# Patient Record
Sex: MALE | URBAN - METROPOLITAN AREA
[De-identification: names, ages, dates, MRNs, and addresses within clinical notes are randomized per-mention and may not be internally consistent; named-entity substitution may affect disease eponyms.]

---

## 2024-11-27 ENCOUNTER — OFFICE VISIT (OUTPATIENT)
Dept: URGENT CARE | Age: 34
End: 2024-11-27
Payer: COMMERCIAL

## 2024-11-27 ENCOUNTER — APPOINTMENT (OUTPATIENT)
Dept: URGENT CARE | Age: 34
End: 2024-11-27
Payer: COMMERCIAL

## 2024-11-27 VITALS
DIASTOLIC BLOOD PRESSURE: 80 MMHG | TEMPERATURE: 97.7 F | OXYGEN SATURATION: 98 % | SYSTOLIC BLOOD PRESSURE: 112 MMHG | RESPIRATION RATE: 18 BRPM | HEART RATE: 95 BPM

## 2024-11-27 DIAGNOSIS — L08.9 SKIN INFECTION: Primary | ICD-10-CM

## 2024-11-27 PROCEDURE — 87491 CHLMYD TRACH DNA AMP PROBE: CPT

## 2024-11-27 PROCEDURE — 87205 SMEAR GRAM STAIN: CPT

## 2024-11-27 PROCEDURE — 87075 CULTR BACTERIA EXCEPT BLOOD: CPT

## 2024-11-27 PROCEDURE — 87070 CULTURE OTHR SPECIMN AEROBIC: CPT

## 2024-11-27 PROCEDURE — 87186 SC STD MICRODIL/AGAR DIL: CPT

## 2024-11-27 PROCEDURE — 87077 CULTURE AEROBIC IDENTIFY: CPT

## 2024-11-27 PROCEDURE — 87185 SC STD ENZYME DETCJ PER NZM: CPT

## 2024-11-27 PROCEDURE — 87529 HSV DNA AMP PROBE: CPT

## 2024-11-27 PROCEDURE — 87591 N.GONORRHOEAE DNA AMP PROB: CPT

## 2024-11-27 PROCEDURE — 87661 TRICHOMONAS VAGINALIS AMPLIF: CPT

## 2024-11-27 RX ORDER — DOXYCYCLINE 100 MG/1
100 CAPSULE ORAL 2 TIMES DAILY
Qty: 14 CAPSULE | Refills: 0 | Status: SHIPPED | OUTPATIENT
Start: 2024-11-27 | End: 2024-12-04

## 2024-11-27 NOTE — PROGRESS NOTES
Subjective   Patient ID: Jordan Villareal is a 34 y.o. male. They present today with a chief complaint of std check (Patient started noticing acne lesions on inner buttocks, close to and around anus. Onset 3 days ago. Patient states he has one partner. ).    History of Present Illness  HPI  Patient presents to the urgent care as a 34-year-old male with a 3-day history of skin infection on buttocks.  Patient denies any pain, itching, or discharge.  Patient states he has been with 1 partner, but is concerned for genital wart.  Patient states he did get new workout shorts to have a inside compression, patient works out regularly, sometimes for a long time, but does report showering immediately after finishing his workouts.    Past Medical History  Allergies as of 11/27/2024    (No Known Allergies)       (Not in a hospital admission)       No past medical history on file.    No past surgical history on file.     reports that he has never smoked. He has never used smokeless tobacco. He reports current alcohol use.    Review of Systems  Review of Systems     Patient denies scrotal edema, penile discharge, testicular pain, changes in medication, foods, laundry detergents, lotions, soap, new foods, numbness, tingling, discharge, sore throat, fever, dizziness, shortness of breath, increased urinary frequency, chills, peripheral edema, abdominal pain, chest pain and myalgias.                          Objective    Vitals:    11/27/24 1706   BP: 112/80   Pulse: 95   Resp: 18   Temp: 36.5 °C (97.7 °F)   SpO2: 98%     No LMP for male patient.    Physical Exam  Appearance: Alert, oriented, cooperative, in no acute distress. Well nourished & well hydrated.    Skin: no petechiae or purpura. Good skin turgor. Xray tech Amira present for exam, 4-5 individual pustules w mild surrounding erythema on bilateral buttocks, small smooth flesh colored skin tag approx 0.1 cm at 7 o'clock position to anus approximately 1 inch from anus,     Eyes:  Conjunctiva pink with no redness or exudates. Eyelids without lesions. No scleral icterus.     ENT: Hearing grossly intact. External inspection of ears without lesions or erythema. no nasal flaring. moist oral mucosa, no tripoding, no drooling, no difficulty swallowing oral secretions.    Pulmonary:  Good chest wall excursion. No accessory muscle use or stridor. no difficulty completing full sentences without taking a breath    Cardiac:  No JVD.     Musculoskeletal: no deformity. No cyanosis, clubbing.     Neurological: no focal findings identified.    Psychiatric: Appropriate mood and affect.    Procedures    Point of Care Test & Imaging Results from this visit  No results found for this visit on 11/27/24.   No results found.    Diagnostic study results (if any) were reviewed by Natalia Morales PA-C.    Assessment/Plan   Allergies, medications, history, and pertinent labs/EKGs/Imaging reviewed by Natalia Morales PA-C.     Medical Decision Making  Considerations for patient's symptoms include viral/bacterial infection, ingrown hair.  Patient expressed concerns for genital warts, STI.  Patient has been with 1 partner and had no known exposure.  Area of concern on patient's for wart on exam-smooth mildly raised faint skin tag approx 0.1 cm, patient also had 4-5 individual pustules, appears as acne.  Cultures will be sent out for HSV, bacteria, gonorrhea, chlamydia, trichomonas.  Discussed with patient at this time folliculitis considered the most likely diagnosis.  Recommend using exfoliation after exercising, and wearing shorts that are easily breathable.  Patient will be given trial of doxycycline while cultures are pending.  Recommended follow-up with PCP if symptoms are not improving with treatment.  Patient verbalizes understanding and agrees with plan of care.  All questions were answered.     Dictation software was used in the creation of this note which does not evaluate or correct for typographical,  spelling, syntax or grammatical errors.    Orders and Diagnoses  There are no diagnoses linked to this encounter.    Medical Admin Record      Patient disposition: Home    Electronically signed by Natalia Morales PA-C  5:13 PM

## 2024-11-27 NOTE — PATIENT INSTRUCTIONS
Begin taking doxycycline as instructed  Cultures were sent out for HSV, general bacteria, gonorrhea, chlamydia, trichomonas.  You will be notified if positive.  Follow-up with your primary care if symptoms are not improving, if you have any worsening symptoms such as pain, fever, spread of the rash please go to the ER for further evaluation and treatment.

## 2024-11-28 LAB
C TRACH RRNA SPEC QL NAA+PROBE: NEGATIVE
HSV1 DNA SKIN QL NAA+PROBE: NOT DETECTED
HSV2 DNA SKIN QL NAA+PROBE: NOT DETECTED
N GONORRHOEA DNA SPEC QL PROBE+SIG AMP: NEGATIVE
T VAGINALIS RRNA SPEC QL NAA+PROBE: NEGATIVE

## 2024-12-01 LAB
B-LACTAMASE ORGANISM ISLT: NEGATIVE
BACTERIA SPEC CULT: ABNORMAL
BACTERIA SPEC CULT: ABNORMAL
GRAM STN SPEC: ABNORMAL
GRAM STN SPEC: ABNORMAL

## 2025-02-13 ENCOUNTER — OFFICE VISIT (OUTPATIENT)
Dept: URGENT CARE | Age: 35
End: 2025-02-13
Payer: COMMERCIAL

## 2025-02-13 DIAGNOSIS — R09.89 CHEST CONGESTION: Primary | ICD-10-CM

## 2025-02-13 RX ORDER — AMOXICILLIN AND CLAVULANATE POTASSIUM 875; 125 MG/1; MG/1
TABLET, FILM COATED ORAL
COMMUNITY
Start: 2025-02-13

## 2025-02-13 ASSESSMENT — ENCOUNTER SYMPTOMS
COUGH: 1
FATIGUE: 1

## 2025-02-14 NOTE — PROGRESS NOTES
Subjective   Patient ID: Jordan Villareal is a 35 y.o. male. They present today with a chief complaint of Illness (Day 4 - Cough, Nasal congestion, ), Fatigue, and Chills.    History of Present Illness    Illness  Associated symptoms: congestion, cough and fatigue    Fatigue  Associated symptoms: congestion, cough and fatigue      This is a 35-year-old male who presents this evening requesting a chest x-ray.  Patient apparently was seen at another facility shortly prior to his arrival where he was prescribed Augmentin, Bromfed and a Medrol Dosepak.  Patient states she was tested negative for COVID ,flu and strep at the other facility.  Patient was requesting a chest x-ray in order to rule out pneumonia.  The patient was informed that no x-rays in our facility are available this evening.  He was advised to return tomorrow but instructed to start his prescribed medication.  He denies any chest pain or shortness of breath.  Past Medical History  Allergies as of 02/13/2025    (No Known Allergies)       (Not in a hospital admission)       History reviewed. No pertinent past medical history.    History reviewed. No pertinent surgical history.     reports that he has never smoked. He has never used smokeless tobacco. He reports current alcohol use.    Review of Systems  Review of Systems   Constitutional:  Positive for fatigue.   HENT:  Positive for congestion.    Respiratory:  Positive for cough.    All other systems reviewed and are negative.                                 Objective    There were no vitals filed for this visit.  No LMP for male patient.    Physical Exam  No physical examination was performed on the patient.  Procedures    Point of Care Test & Imaging Results from this visit  No results found for this visit on 02/13/25.   No results found.    Diagnostic study results (if any) were reviewed by Luis Guevara DO.    Assessment/Plan   Allergies, medications, history, and pertinent labs/EKGs/Imaging  reviewed by Luis Guevara DO.     Medical Decision Making      Orders and Diagnoses  Diagnoses and all orders for this visit:  Chest congestion      Medical Admin Record      Patient disposition: Home    Electronically signed by Luis Guevara DO  7:55 PM